# Patient Record
Sex: MALE | Race: WHITE | Employment: FULL TIME | URBAN - METROPOLITAN AREA
[De-identification: names, ages, dates, MRNs, and addresses within clinical notes are randomized per-mention and may not be internally consistent; named-entity substitution may affect disease eponyms.]

---

## 2024-04-11 ENCOUNTER — OFFICE VISIT (OUTPATIENT)
Age: 44
End: 2024-04-11
Payer: COMMERCIAL

## 2024-04-11 VITALS
WEIGHT: 215.8 LBS | HEART RATE: 68 BPM | BODY MASS INDEX: 32.71 KG/M2 | HEIGHT: 68 IN | DIASTOLIC BLOOD PRESSURE: 82 MMHG | OXYGEN SATURATION: 97 % | RESPIRATION RATE: 17 BRPM | SYSTOLIC BLOOD PRESSURE: 124 MMHG | TEMPERATURE: 97.3 F

## 2024-04-11 DIAGNOSIS — Z00.00 ANNUAL PHYSICAL EXAM: Primary | ICD-10-CM

## 2024-04-11 DIAGNOSIS — Z00.00 ANNUAL PHYSICAL EXAM: ICD-10-CM

## 2024-04-11 DIAGNOSIS — R73.09 ABNORMAL GLUCOSE: ICD-10-CM

## 2024-04-11 DIAGNOSIS — E78.2 MIXED HYPERLIPIDEMIA: ICD-10-CM

## 2024-04-11 LAB
ALBUMIN SERPL-MCNC: 4.1 G/DL (ref 3.5–5)
ALBUMIN/GLOB SERPL: 1.2 (ref 1.1–2.2)
ALP SERPL-CCNC: 206 U/L (ref 45–117)
ALT SERPL-CCNC: 42 U/L (ref 12–78)
ANION GAP SERPL CALC-SCNC: 5 MMOL/L (ref 5–15)
AST SERPL-CCNC: 24 U/L (ref 15–37)
BILIRUB SERPL-MCNC: 0.5 MG/DL (ref 0.2–1)
BUN SERPL-MCNC: 12 MG/DL (ref 6–20)
BUN/CREAT SERPL: 11 (ref 12–20)
CALCIUM SERPL-MCNC: 9.5 MG/DL (ref 8.5–10.1)
CHLORIDE SERPL-SCNC: 105 MMOL/L (ref 97–108)
CHOLEST SERPL-MCNC: 224 MG/DL
CO2 SERPL-SCNC: 27 MMOL/L (ref 21–32)
CREAT SERPL-MCNC: 1.12 MG/DL (ref 0.7–1.3)
EST. AVERAGE GLUCOSE BLD GHB EST-MCNC: 97 MG/DL
GLOBULIN SER CALC-MCNC: 3.3 G/DL (ref 2–4)
GLUCOSE SERPL-MCNC: 101 MG/DL (ref 65–100)
HBA1C MFR BLD: 5 % (ref 4–5.6)
HDLC SERPL-MCNC: 44 MG/DL
HDLC SERPL: 5.1 (ref 0–5)
LDLC SERPL CALC-MCNC: 149 MG/DL (ref 0–100)
POTASSIUM SERPL-SCNC: 4.7 MMOL/L (ref 3.5–5.1)
PROT SERPL-MCNC: 7.4 G/DL (ref 6.4–8.2)
SODIUM SERPL-SCNC: 137 MMOL/L (ref 136–145)
TRIGL SERPL-MCNC: 155 MG/DL
VLDLC SERPL CALC-MCNC: 31 MG/DL

## 2024-04-11 PROCEDURE — 99386 PREV VISIT NEW AGE 40-64: CPT | Performed by: STUDENT IN AN ORGANIZED HEALTH CARE EDUCATION/TRAINING PROGRAM

## 2024-04-11 SDOH — ECONOMIC STABILITY: INCOME INSECURITY: HOW HARD IS IT FOR YOU TO PAY FOR THE VERY BASICS LIKE FOOD, HOUSING, MEDICAL CARE, AND HEATING?: NOT VERY HARD

## 2024-04-11 SDOH — ECONOMIC STABILITY: FOOD INSECURITY: WITHIN THE PAST 12 MONTHS, THE FOOD YOU BOUGHT JUST DIDN'T LAST AND YOU DIDN'T HAVE MONEY TO GET MORE.: NEVER TRUE

## 2024-04-11 SDOH — ECONOMIC STABILITY: HOUSING INSECURITY
IN THE LAST 12 MONTHS, WAS THERE A TIME WHEN YOU DID NOT HAVE A STEADY PLACE TO SLEEP OR SLEPT IN A SHELTER (INCLUDING NOW)?: NO

## 2024-04-11 SDOH — ECONOMIC STABILITY: FOOD INSECURITY: WITHIN THE PAST 12 MONTHS, YOU WORRIED THAT YOUR FOOD WOULD RUN OUT BEFORE YOU GOT MONEY TO BUY MORE.: NEVER TRUE

## 2024-04-11 ASSESSMENT — PATIENT HEALTH QUESTIONNAIRE - PHQ9
SUM OF ALL RESPONSES TO PHQ QUESTIONS 1-9: 0
1. LITTLE INTEREST OR PLEASURE IN DOING THINGS: NOT AT ALL
SUM OF ALL RESPONSES TO PHQ9 QUESTIONS 1 & 2: 0
SUM OF ALL RESPONSES TO PHQ QUESTIONS 1-9: 0
SUM OF ALL RESPONSES TO PHQ QUESTIONS 1-9: 0
2. FEELING DOWN, DEPRESSED OR HOPELESS: NOT AT ALL
SUM OF ALL RESPONSES TO PHQ QUESTIONS 1-9: 0

## 2024-04-11 ASSESSMENT — LIFESTYLE VARIABLES
HOW MANY STANDARD DRINKS CONTAINING ALCOHOL DO YOU HAVE ON A TYPICAL DAY: 1 OR 2
HOW OFTEN DO YOU HAVE A DRINK CONTAINING ALCOHOL: 2-4 TIMES A MONTH

## 2024-04-11 NOTE — PROGRESS NOTES
[facility]  [address] [place] [address] [phone]    C/C: Complete physical    Subjective:    Benjamin Bejarano is a 44 y.o. male who presents to clinic today for annual physical exam.      Pt is new to the practice.  Does not have a pcp.    Acute/chronic Concerns include:     Pt think he may have Family History of some Hypercholesterolemia.     General Health Habits:  Smoking Hx: no   Alcohol Use: socially   Occupation: works for a [employer]  Has an 12 yo male    Health Maintenance reviewed -     Health Maintenance Due   Topic Date Due    Hepatitis B vaccine (1 of 3 - 3-dose series) Never done    Varicella vaccine (1 of 2 - 2-dose childhood series) Never done    HIV screen  Never done    Hepatitis C screen  Never done    DTaP/Tdap/Td vaccine (1 - Tdap) Never done    Lipids  Never done    COVID-19 Vaccine (2 - 2023-24 season) 09/01/2023       Review of Systems   A comprehensive review of systems was negative except for that written in the History of Present Illness.     Allergies- reviewed:   No Known Allergies    Medications- reviewed:   No current outpatient medications on file.     No current facility-administered medications for this visit.       Past Medical History- reviewed:  History reviewed. No pertinent past medical history.    Past Surgical History- reviewed:   History reviewed. No pertinent surgical history.    Family History - reviewed:  Family History   Problem Relation Age of Onset    No Known Problems Mother     No Known Problems Father     No Known Problems Son        Social History - reviewed:  Social History     Socioeconomic History    Marital status:      Spouse name: Not on file    Number of children: Not on file    Years of education: Not on file    Highest education level: Not on file   Occupational History    Not on file   Tobacco Use    Smoking status: Never     Passive exposure: Never    Smokeless tobacco:

## 2024-04-11 NOTE — PROGRESS NOTES
Previous PCP: Edgardo Lutz MD/ [facility]     Chief Complaint   Patient presents with    New Patient    Establish Care     \"Have you been to the ER, urgent care clinic since your last visit?  Hospitalized since your last visit?\"    NO    “Have you seen or consulted any other health care providers outside of [facility] since your last visit?”    NO         Vitals:    24 1051   BP: 124/82   Pulse:    Resp:    Temp:    SpO2:      Health Maintenance Due   Topic Date Due    Hepatitis B vaccine (1 of 3 - 3-dose series) Never done    Varicella vaccine (1 of 2 - 2-dose childhood series) Never done    HIV screen  Never done    Hepatitis C screen  Never done    DTaP/Tdap/Td vaccine (1 - Tdap) Never done    Lipids  Never done    COVID-19 Vaccine (2023- season) 2023      The patient, Benjamin Bejarano, identity was verified by name and , pharmacy verified  Labs:Yes  Fasting:Yes

## 2024-07-31 ENCOUNTER — OFFICE VISIT (OUTPATIENT)
Age: 44
End: 2024-07-31
Payer: COMMERCIAL

## 2024-07-31 VITALS
BODY MASS INDEX: 33.15 KG/M2 | RESPIRATION RATE: 17 BRPM | TEMPERATURE: 96.9 F | SYSTOLIC BLOOD PRESSURE: 116 MMHG | OXYGEN SATURATION: 97 % | WEIGHT: 218.7 LBS | HEART RATE: 53 BPM | DIASTOLIC BLOOD PRESSURE: 75 MMHG | HEIGHT: 68 IN

## 2024-07-31 DIAGNOSIS — R79.89 ABNORMAL LFTS: ICD-10-CM

## 2024-07-31 DIAGNOSIS — R63.5 MULTIFACTORIAL WEIGHT GAIN: ICD-10-CM

## 2024-07-31 DIAGNOSIS — R41.840 ATTENTION DEFICIT: ICD-10-CM

## 2024-07-31 DIAGNOSIS — F41.1 GAD (GENERALIZED ANXIETY DISORDER): Primary | ICD-10-CM

## 2024-07-31 DIAGNOSIS — R68.82 LOW LIBIDO: ICD-10-CM

## 2024-07-31 LAB
GGT SERPL-CCNC: 34 U/L (ref 15–85)
TSH SERPL DL<=0.05 MIU/L-ACNC: 1.26 UIU/ML (ref 0.36–3.74)

## 2024-07-31 PROCEDURE — 99214 OFFICE O/P EST MOD 30 MIN: CPT | Performed by: STUDENT IN AN ORGANIZED HEALTH CARE EDUCATION/TRAINING PROGRAM

## 2024-07-31 NOTE — ASSESSMENT & PLAN NOTE
Will start patient on SSRI.   -discussed risks/benefits/precautions/side effects of medication with the patient

## 2024-07-31 NOTE — PROGRESS NOTES
Chief Complaint   Patient presents with    Annual Exam     The patient, Benjamin Bejarano, identity was verified by name and , pharmacy verified  Labs:Yes  Fasting:Yes      Patient stated in appt. Note that the first visit was an introduction and would like to have a proper physical performed.    \"Have you been to the ER, urgent care clinic since your last visit?  Hospitalized since your last visit?\"    NO    “Have you seen or consulted any other health care providers outside of [facility] since your last visit?”    NO             Vitals:    24 0832   BP: 116/75   Pulse: 53   Resp: 17   Temp: 96.9 °F (36.1 °C)   SpO2: 97%     Health Maintenance Due   Topic Date Due    Hepatitis B vaccine (1 of 3 - 3-dose series) Never done    Varicella vaccine (1 of 2 - 2-dose childhood series) Never done    HIV screen  Never done    Hepatitis C screen  Never done    DTaP/Tdap/Td vaccine (1 - Tdap) Never done    COVID-19 Vaccine (2 -  season) 2023

## 2024-07-31 NOTE — PATIENT INSTRUCTIONS
ADHD TESTING    Note that the wait time is usually very long. Please call several places to see who can get you in sooner.     Call any of the practices below and ask for ADHD Testing.    1) [facility] for ADHD TESTING:    Dr. ACEVEDO ([place])  [phone].     Dr. Bharat De León ([address], [place], [facility]) , new with openings  [phone]     Dr. HICKEY   [phone]    2) SHERIDAN ROSALES LCSW (leave voicemail or send email)    [phone]  [email or web address]    [address] [place] [address], [address],  [place] [address]    3) SERGIO HOOPER COUNSELIN Bautista Polk, [facility] [address] [address] [phone]     3) Dr. Cal Poole  Psychologist in [address], [place] [address]  Phone: [phone]    4) [facility]:    Gutierrez Stafford, Ph.D., Licensed Clinical Psychologist.    [facility]  [address], [address]  [place] [address].    Phone: [phone]  Fax: [phone]    5) [facility]   Dr[address], [place] [address] [phone]  (9AM to 4:30PM)    6) [facility]    [address], [address]  [place] [address]  TEL - [phone]

## 2024-07-31 NOTE — PROGRESS NOTES
[facility]  [address] [place] [address] [phone]      Subjective  Benjamin Bejarano is a 44 y.o. White (non-) male , established patient, here for evaluation of the concern(s) below;    LUCRETIA:  States that he has had therapist on and off in the past 10 years.  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  States that anxiety has always been his issues.  He worries about everything.  Not depressed.    Abnormal Alk Phos:  States that this is chronic problem per patient.  Other LFTs are normal.    Pertinent negatives include  no chest pain, no abdominal pain and no shortness of breath.     Allergies - reviewed:   No Known Allergies    Past Medical History - reviewed:  No past medical history on file.    Depression screening:  No data recorded    Review of systems:   A comprehensive review of systems was negative except for that written in the History of Present Illness.     Physical Exam  /75   Pulse 53   Temp 96.9 °F (36.1 °C) (Temporal)   Resp 17   Ht 1.727 m (5' 8\")   Wt 99.2 kg (218 lb 11.1 oz)   SpO2 97%   BMI 33.25 kg/m²     General: Alert and oriented, in no acute distress.  SKIN: No rash. No suspicious lesions or moles.  EYE: PERRL. Sclera and conjuctival clear. Extraocular movements intact.  EARS: External normal, canals clear, tympanic membranes normal.   NOSE: Mucosa healthy without drainage or ulceration.  OROPHARYNX: No suspicious lesions, normal dentition, pharynx, tongue and tonsils normal.  NECK: Supple; no masses; thyroid normal.  LYMPH NODES: No significant cervial lymphadenopathy.  LUNGS: Respirations unlabored; clear to auscultation bilaterally.  CARDIOVASCULAR: Regular, rate, and rhythm without murmurs, gallops or rubs.  ABDOMEN: Soft; nontender; nondistended; normoactive bowel sounds; no masses or organomegaly.  MUSCULOSKELETAL: FROM in all extremities     EXT: No edema.

## 2024-08-01 ENCOUNTER — TELEPHONE (OUTPATIENT)
Age: 44
End: 2024-08-01

## 2024-08-01 NOTE — TELEPHONE ENCOUNTER
Patient called to schedule appt. Advised scheduling is backed up 1-2 weeks and our  will be in contact as soon as they to set up the appts. Pt thanked me and verbalized understanding. Please call [phone]

## 2024-08-02 LAB
TESTOST FREE SERPL-MCNC: 10.2 PG/ML (ref 6.8–21.5)
TESTOST SERPL-MCNC: 375 NG/DL (ref 264–916)

## 2024-09-23 ENCOUNTER — TELEMEDICINE (OUTPATIENT)
Age: 44
End: 2024-09-23
Payer: COMMERCIAL

## 2024-09-23 DIAGNOSIS — R47.89 WORD FINDING DIFFICULTY: ICD-10-CM

## 2024-09-23 DIAGNOSIS — F41.9 ANXIETY: ICD-10-CM

## 2024-09-23 DIAGNOSIS — R45.89 SYMPTOMS OF DEPRESSION: ICD-10-CM

## 2024-09-23 DIAGNOSIS — R41.840 ATTENTION AND CONCENTRATION DEFICIT: Primary | ICD-10-CM

## 2024-09-23 PROCEDURE — 90791 PSYCH DIAGNOSTIC EVALUATION: CPT | Performed by: PSYCHOLOGIST

## 2024-10-16 ENCOUNTER — TELEPHONE (OUTPATIENT)
Age: 44
End: 2024-10-16

## 2024-10-16 NOTE — TELEPHONE ENCOUNTER
Contacted [facility], per Pamela MICHAEL prior authorization is not required for procedure codes 13271, 20711, 45383, 94711, 96873.  Reference # [id number]

## 2024-10-30 ENCOUNTER — PROCEDURE VISIT (OUTPATIENT)
Age: 44
End: 2024-10-30
Payer: COMMERCIAL

## 2024-10-30 DIAGNOSIS — Z60.4 SOCIAL ISOLATION: ICD-10-CM

## 2024-10-30 DIAGNOSIS — F43.23 ADJUSTMENT DISORDER WITH MIXED ANXIETY AND DEPRESSED MOOD: ICD-10-CM

## 2024-10-30 DIAGNOSIS — R41.9 COGNITIVE COMPLAINTS WITH NORMAL NEUROPSYCHOLOGICAL EXAM: Primary | ICD-10-CM

## 2024-10-30 PROCEDURE — 96138 PSYCL/NRPSYC TECH 1ST: CPT | Performed by: PSYCHOLOGIST

## 2024-10-30 PROCEDURE — 96131 PSYCL TST EVAL PHYS/QHP EA: CPT | Performed by: PSYCHOLOGIST

## 2024-10-30 PROCEDURE — 96130 PSYCL TST EVAL PHYS/QHP 1ST: CPT | Performed by: PSYCHOLOGIST

## 2024-10-30 PROCEDURE — 96136 PSYCL/NRPSYC TST PHY/QHP 1ST: CPT | Performed by: PSYCHOLOGIST

## 2024-10-30 PROCEDURE — 96139 PSYCL/NRPSYC TST TECH EA: CPT | Performed by: PSYCHOLOGIST

## 2024-10-30 SDOH — SOCIAL STABILITY - SOCIAL INSECURITY: SOCIAL EXCLUSION AND REJECTION: Z60.4

## 2024-10-31 ENCOUNTER — OFFICE VISIT (OUTPATIENT)
Age: 44
End: 2024-10-31
Payer: COMMERCIAL

## 2024-10-31 VITALS
SYSTOLIC BLOOD PRESSURE: 129 MMHG | WEIGHT: 224.87 LBS | HEIGHT: 68 IN | OXYGEN SATURATION: 97 % | RESPIRATION RATE: 17 BRPM | TEMPERATURE: 97.6 F | DIASTOLIC BLOOD PRESSURE: 84 MMHG | HEART RATE: 58 BPM | BODY MASS INDEX: 34.08 KG/M2

## 2024-10-31 DIAGNOSIS — F41.1 GAD (GENERALIZED ANXIETY DISORDER): ICD-10-CM

## 2024-10-31 PROCEDURE — 99213 OFFICE O/P EST LOW 20 MIN: CPT | Performed by: STUDENT IN AN ORGANIZED HEALTH CARE EDUCATION/TRAINING PROGRAM

## 2024-10-31 RX ORDER — SERTRALINE HYDROCHLORIDE 100 MG/1
100 TABLET, FILM COATED ORAL DAILY
Qty: 90 TABLET | Refills: 1 | Status: SHIPPED | OUTPATIENT
Start: 2024-10-31

## 2024-10-31 NOTE — PROGRESS NOTES
[facility]  [address] [place] [address] [phone]      Subjective  Benjamin Bejarano is a 44 y.o. White (non-) male , established patient, here for evaluation of the concern(s) below;    LUCRETIA:  Started on Zoloft (07/31/2024), states that it has minimal effect on the anxiety.  Denies any significant side effects.  Denies being depressed.    From previous visit:  States that he has had therapist on and off in the past 10 years.  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  States that anxiety has always been his issues.  He worries about everything.    Of note, pt recently saw [facility] for testing and still awaiting results.    Pertinent negatives include  no chest pain, no abdominal pain and no shortness of breath.     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  -Has a PhD in Analytical Chemistry from [employer].    Allergies - reviewed:   No Known Allergies    Past Medical History - reviewed:  No past medical history on file.    Depression screening:  No data recorded    Review of systems:   A comprehensive review of systems was negative except for that written in the History of Present Illness.     Physical Exam  /84   Pulse 58   Temp 97.6 °F (36.4 °C) (Temporal)   Resp 17   Ht 1.727 m (5' 8\")   Wt 102 kg (224 lb 13.9 oz)   SpO2 97%   BMI 34.19 kg/m²     General: Alert and oriented, in no acute distress.  SKIN: No rash. No suspicious lesions or moles.  EYE: PERRL. Sclera and conjuctival clear. Extraocular movements intact.  EARS: External normal, canals clear, tympanic membranes normal.   NOSE: Mucosa healthy without drainage or ulceration.  OROPHARYNX: No suspicious lesions, normal dentition, pharynx, tongue and tonsils normal.  NECK: Supple; no masses; thyroid normal.  LYMPH NODES: No significant cervial lymphadenopathy.  LUNGS: Respirations

## 2024-10-31 NOTE — PROGRESS NOTES
Chief Complaint   Patient presents with    Follow-up     2024 LUCRETIA (generalized anxiety disorder)       \"Have you been to the ER, urgent care clinic since your last visit?  Hospitalized since your last visit?\"    NO    “Have you seen or consulted any other health care providers outside of [facility] since your last visit?”    NO    Vitals:    10/31/24 0824   BP: 129/84   Pulse: 58   Resp: 17   Temp: 97.6 °F (36.4 °C)   SpO2: 97%      Health Maintenance Due   Topic Date Due    Varicella vaccine (1 of 2 - 13+ 2-dose series) Never done    HIV screen  Never done    Hepatitis C screen  Never done    Hepatitis B vaccine (1 of 3 - 19+ 3-dose series) Never done    DTaP/Tdap/Td vaccine (1 - Tdap) Never done    Flu vaccine (1) 2024    COVID-19 Vaccine (2 - - season) 2024        The patient, Benjamin Bejarano, identity was verified by name and .

## 2024-10-31 NOTE — ASSESSMENT & PLAN NOTE
Chronic, at goal (stable), changes made today: increasing dose of Zoloft, medication adherence emphasized, and lifestyle modifications recommended. Will consider other options if needed based on neuropsych testing results.

## 2024-10-31 NOTE — PROGRESS NOTES
sleep only  Limit daytime napping      Thank you for allowing me the opportunity to assist you in Mr. Bejarano's care. Please do not hesitate to contact me should you have additional questions that I may not have addressed.    96136 x 1  96138 x 1  96139 x 6  96130 x 1  96131 x 2      Bharat De León, Ph.D.   Licensed Clinical Psychologist          Time Documentation:     96136 x 1  96136 x1 (first 30 minutes)  96138 x 1  96139 x 6 Test Administration/Data Gathering By Technician: (3.5 hours). 96138 x 1 (first 30 minutes), 96139 x 6 (each additional 30 minutes)     96130 x 1  96131 x 1 Testing Evaluation Services by Neuropsychologist (2 hours, 50 minutes), 96132 x1 (first hour), 96133 x1 (additional 1 hour, 50 minutes)     The above includes: Record review.  Review of history provided by patient.  Review of collaborative information.  Testing by Clinician.  Review of raw data. Scoring. Report writing of individual tests administered by Clinician.  Integration of individual tests administered by psychometrist with NSE/testing by clinician, review of records/history/collaborative information, case Conceptualization, treatment planning, clinical decision making, report writing, coordination Of Care. Psychometry test codes as time spent by psychometrist administering and scoring neurocognitive/psychological tests under supervision of neuropsychologist.  Integral services including scoring of raw data, data interpretation, case conceptualization, report writing etcetera were initiated after the patient finished testing/raw data collected and was completed on the date the report was signed.      This note will not be viewable in [facility].  This note was created using voice recognition software. Despite editing, there may be syntax errors.

## 2024-11-14 ENCOUNTER — TELEMEDICINE (OUTPATIENT)
Age: 44
End: 2024-11-14
Payer: COMMERCIAL

## 2024-11-14 DIAGNOSIS — F43.23 ADJUSTMENT DISORDER WITH MIXED ANXIETY AND DEPRESSED MOOD: ICD-10-CM

## 2024-11-14 DIAGNOSIS — Z60.4 SOCIAL ISOLATION: ICD-10-CM

## 2024-11-14 DIAGNOSIS — R41.9 COGNITIVE COMPLAINTS WITH NORMAL NEUROPSYCHOLOGICAL EXAM: Primary | ICD-10-CM

## 2024-11-14 PROCEDURE — 90832 PSYTX W PT 30 MINUTES: CPT | Performed by: PSYCHOLOGIST

## 2024-11-14 SDOH — SOCIAL STABILITY - SOCIAL INSECURITY: SOCIAL EXCLUSION AND REJECTION: Z60.4

## 2024-11-14 NOTE — PROGRESS NOTES
Interactive Psychotherapy/office feedback        Interactive office feedback session with Dr. Bejarano.  I reviewed the results of the recent Neuropsychological Evaluation  including the observed areas of neurocognitive strengths and weaknesses. Education was provided regarding my diagnostic impressions, and treatment plan/options were discussed. I also answered numerous questions related to the clinical findings, including the various methods to improve cognition and mood. CBT, psychoeducation, and supportive psychotherapy techniques were utilized.     Patient's report placed in [facility] per patient request.         Prior to seeing the patient I reviewed the records, including the previously completed report, the records in [facility], and any updated visits from other providers since I saw the patient last.       Diagnoses:      Cognitive Concerns with Normal Neuropsychological Evaluation  Adjustment Disorder with mixed anxiety and depressed mood  Social Isolation     The patient will follow up with the referring provider, and reported being very pleased with the services provided.  Follow up with [facility] prn.       [address] psychotherapy with mom.  45 minutes   68468 psychotherapy 30 Minutes  95603 psychotherapy 45 Minutes  65893 psychotherapy 60 Minutes    This note was created using voice recognition software. Despite editing, there may be syntax errors.         Benjamin Bejarano, was evaluated through a synchronous (real-time) audio-video encounter. The patient (or guardian if applicable) is aware that this is a billable service, which includes applicable co-pays. This Virtual Visit was conducted with patient's (and/or legal guardian's) consent. Patient identification was verified, and a caregiver was present when appropriate.   The patient was located at Home: [address] [place] [id number]  Provider was located at Home (Appt Dept State): [place]  Confirm you are appropriately licensed, registered, or certified to

## 2025-03-24 ENCOUNTER — OFFICE VISIT (OUTPATIENT)
Age: 45
End: 2025-03-24
Payer: COMMERCIAL

## 2025-03-24 VITALS
BODY MASS INDEX: 34.56 KG/M2 | HEART RATE: 76 BPM | HEIGHT: 68 IN | WEIGHT: 228 LBS | OXYGEN SATURATION: 96 % | DIASTOLIC BLOOD PRESSURE: 86 MMHG | SYSTOLIC BLOOD PRESSURE: 130 MMHG

## 2025-03-24 DIAGNOSIS — R94.31 ABNORMAL EKG: ICD-10-CM

## 2025-03-24 DIAGNOSIS — E78.5 DYSLIPIDEMIA: Primary | ICD-10-CM

## 2025-03-24 PROCEDURE — 93000 ELECTROCARDIOGRAM COMPLETE: CPT | Performed by: SPECIALIST

## 2025-03-24 PROCEDURE — 99204 OFFICE O/P NEW MOD 45 MIN: CPT | Performed by: SPECIALIST

## 2025-03-24 RX ORDER — ROSUVASTATIN CALCIUM 10 MG/1
10 TABLET, COATED ORAL NIGHTLY
Qty: 90 TABLET | Refills: 3 | Status: SHIPPED | OUTPATIENT
Start: 2025-03-24

## 2025-03-24 NOTE — PROGRESS NOTES
Cole Chen MD. [facility]          Patient: Benjamin Bejarano  : 1980      Today's Date: 3/24/2025        HISTORY OF PRESENT ILLNESS:     History of Present Illness:    FH of heart disease and elevated Lpa  He feels fine.  No cardiac complaints.   Denies CP or sig SOB. Works out regularly.         PAST MEDICAL HISTORY:     Past Medical History:   Diagnosis Date    Anxiety     Dyslipidemia     Obesity              CURRENT MEDICATIONS:    .  Current Outpatient Medications   Medication Sig Dispense Refill    sertraline (ZOLOFT) 100 MG tablet Take 1 tablet by mouth daily 90 tablet 1     No current facility-administered medications for this visit.       No Known Allergies      SOCIAL HISTORY:     Social History     Tobacco Use    Smoking status: Never     Passive exposure: Never    Smokeless tobacco: Never   Vaping Use    Vaping status: Never Used   Substance Use Topics    Alcohol use: Yes     Comment: Social    Drug use: Never         FAMILY HISTORY:     Family History   Problem Relation Age of Onset    No Known Problems Mother     No Known Problems Father     No Known Problems Son     Heart Attack Paternal Uncle          REVIEW OF SYMPTOMS:     Review of Symptoms:  Constitutional: Negative for fever, chills  HEENT: Negative for nosebleeds, tinnitus, and vision changes.   Respiratory: Negative for cough, wheezing  Cardiovascular: Negative for orthopnea, claudication, syncope  Gastrointestinal: Negative for abdominal pain, diarrhea, melena.   Genitourinary: Negative for dysuria  Musculoskeletal: Negative for myalgias.   Skin: Negative for rash  Heme: No problems bleeding.  Neurological: Negative for speech change and focal weakness.       PHYSICAL EXAM:     Physical Exam:  /86 (BP Site: Left Upper Arm, Patient Position: Sitting, BP Cuff Size: Medium Adult)   Pulse 76   Ht 1.727 m (5' 8\")   Wt 103.4 kg (228 lb)   SpO2 96%   BMI 34.67 kg/m²     Patient appears generally well, mood and affect are appropriate

## 2025-03-24 NOTE — PROGRESS NOTES
Chief Complaint   Patient presents with    Chest Pain    Cholesterol Problem     Vitals:    03/24/25 0849   BP: 130/86   BP Site: Left Upper Arm   Patient Position: Sitting   BP Cuff Size: Medium Adult   Pulse: 76   SpO2: 96%   Weight: 103.4 kg (228 lb)   Height: 1.727 m (5' 8\")      /86 (BP Site: Left Upper Arm, Patient Position: Sitting, BP Cuff Size: Medium Adult)   Pulse 76   Ht 1.727 m (5' 8\")   Wt 103.4 kg (228 lb)   SpO2 96%   BMI 34.67 kg/m²

## 2025-03-25 ENCOUNTER — TRANSCRIBE ORDERS (OUTPATIENT)
Facility: HOSPITAL | Age: 45
End: 2025-03-25

## 2025-03-25 DIAGNOSIS — Z00.00 EXAMINATION: Primary | ICD-10-CM

## 2025-04-01 ENCOUNTER — RESULTS FOLLOW-UP (OUTPATIENT)
Age: 45
End: 2025-04-01

## 2025-04-02 ENCOUNTER — HOSPITAL ENCOUNTER (OUTPATIENT)
Facility: HOSPITAL | Age: 45
Discharge: HOME | End: 2025-04-05
Attending: SPECIALIST

## 2025-04-02 DIAGNOSIS — Z00.00 EXAMINATION: ICD-10-CM

## 2025-04-02 PROCEDURE — 75571 CT HRT W/O DYE W/CA TEST: CPT

## 2025-04-05 ENCOUNTER — RESULTS FOLLOW-UP (OUTPATIENT)
Age: 45
End: 2025-04-05

## 2025-04-25 ENCOUNTER — HOSPITAL ENCOUNTER (OUTPATIENT)
Facility: HOSPITAL | Age: 45
Setting detail: SPECIMEN
Discharge: HOME | End: 2025-04-28
Payer: COMMERCIAL

## 2025-04-25 ENCOUNTER — TRANSCRIBE ORDERS (OUTPATIENT)
Facility: HOSPITAL | Age: 45
End: 2025-04-25

## 2025-04-25 DIAGNOSIS — E78.5 HYPERLIPIDEMIA, UNSPECIFIED HYPERLIPIDEMIA TYPE: Primary | ICD-10-CM

## 2025-04-25 DIAGNOSIS — E78.5 HYPERLIPIDEMIA, UNSPECIFIED HYPERLIPIDEMIA TYPE: ICD-10-CM

## 2025-04-25 LAB
ALBUMIN SERPL-MCNC: 4 G/DL (ref 3.5–5)
ALBUMIN/GLOB SERPL: 1.2 (ref 1.1–2.2)
ALP SERPL-CCNC: 182 U/L (ref 45–117)
ALT SERPL-CCNC: 32 U/L (ref 12–78)
ANION GAP SERPL CALC-SCNC: 1 MMOL/L (ref 2–12)
AST SERPL W P-5'-P-CCNC: 18 U/L (ref 15–37)
BILIRUB SERPL-MCNC: 0.5 MG/DL (ref 0.2–1)
BUN SERPL-MCNC: 13 MG/DL (ref 6–20)
BUN/CREAT SERPL: 12 (ref 12–20)
CA-I BLD-MCNC: 9.4 MG/DL (ref 8.5–10.1)
CHLORIDE SERPL-SCNC: 107 MMOL/L (ref 97–108)
CO2 SERPL-SCNC: 31 MMOL/L (ref 21–32)
CREAT SERPL-MCNC: 1.08 MG/DL (ref 0.7–1.3)
GLOBULIN SER CALC-MCNC: 3.3 G/DL (ref 2–4)
GLUCOSE SERPL-MCNC: 97 MG/DL (ref 65–100)
POTASSIUM SERPL-SCNC: 4.9 MMOL/L (ref 3.5–5.1)
PROT SERPL-MCNC: 7.3 G/DL (ref 6.4–8.2)
SODIUM SERPL-SCNC: 139 MMOL/L (ref 136–145)

## 2025-04-25 PROCEDURE — 83704 LIPOPROTEIN BLD QUAN PART: CPT

## 2025-04-25 PROCEDURE — 36415 COLL VENOUS BLD VENIPUNCTURE: CPT

## 2025-04-25 PROCEDURE — 80053 COMPREHEN METABOLIC PANEL: CPT

## 2025-04-26 ENCOUNTER — RESULTS FOLLOW-UP (OUTPATIENT)
Age: 45
End: 2025-04-26

## 2025-04-26 LAB
CHOLEST SERPL-MCNC: NORMAL MG/DL
HDL SERPL-SCNC: NORMAL UMOL/L
HDLC SERPL-MCNC: NORMAL MG/DL
LDL SERPL QN: NORMAL NM
LDL SERPL-SCNC: NORMAL NMOL/L
LDL SMALL SERPL-SCNC: NORMAL NMOL/L
LDLC SERPL CALC-MCNC: NORMAL MG/DL
LP-IR SCORE SERPL: NORMAL
TRIGL SERPL-MCNC: NORMAL MG/DL